# Patient Record
Sex: MALE | Race: WHITE | NOT HISPANIC OR LATINO | URBAN - METROPOLITAN AREA
[De-identification: names, ages, dates, MRNs, and addresses within clinical notes are randomized per-mention and may not be internally consistent; named-entity substitution may affect disease eponyms.]

---

## 2022-03-21 ENCOUNTER — LAB OUTSIDE AN ENCOUNTER (OUTPATIENT)
Dept: URBAN - METROPOLITAN AREA CLINIC 94 | Facility: CLINIC | Age: 57
End: 2022-03-21

## 2022-03-21 ENCOUNTER — WEB ENCOUNTER (OUTPATIENT)
Dept: URBAN - METROPOLITAN AREA CLINIC 94 | Facility: CLINIC | Age: 57
End: 2022-03-21

## 2022-03-21 ENCOUNTER — OFFICE VISIT (OUTPATIENT)
Dept: URBAN - METROPOLITAN AREA CLINIC 94 | Facility: CLINIC | Age: 57
End: 2022-03-21
Payer: COMMERCIAL

## 2022-03-21 VITALS
BODY MASS INDEX: 33.8 KG/M2 | WEIGHT: 255 LBS | TEMPERATURE: 97.7 F | HEART RATE: 99 BPM | HEIGHT: 73 IN | DIASTOLIC BLOOD PRESSURE: 82 MMHG | SYSTOLIC BLOOD PRESSURE: 133 MMHG

## 2022-03-21 DIAGNOSIS — I85.00 ESOPHAGEAL VARICES WITHOUT BLEEDING, UNSPECIFIED ESOPHAGEAL VARICES TYPE: ICD-10-CM

## 2022-03-21 DIAGNOSIS — K70.30 ALCOHOLIC CIRRHOSIS, UNSPECIFIED WHETHER ASCITES PRESENT: ICD-10-CM

## 2022-03-21 PROCEDURE — 99214 OFFICE O/P EST MOD 30 MIN: CPT | Performed by: INTERNAL MEDICINE

## 2022-03-21 RX ORDER — ATENOLOL 25 MG/1
TABLET ORAL
Qty: 0 | Refills: 0 | Status: DISCONTINUED | COMMUNITY
Start: 1900-01-01

## 2022-03-21 RX ORDER — SUCRALFATE 1 G/1
1 TABLET ON AN EMPTY STOMACH TABLET ORAL
Status: ACTIVE | COMMUNITY

## 2022-03-21 RX ORDER — CHLORHEXIDINE GLUCONATE 4 %
LIQUID (ML) TOPICAL
Qty: 0 | Refills: 0 | Status: DISCONTINUED | COMMUNITY
Start: 1900-01-01

## 2022-03-21 RX ORDER — FOLIC ACID 1 MG/1
TABLET ORAL
Qty: 0 | Refills: 0 | Status: DISCONTINUED | COMMUNITY
Start: 1900-01-01

## 2022-03-21 RX ORDER — FUROSEMIDE 40 MG/1
1 TABLET TABLET ORAL ONCE A DAY
Status: ACTIVE | COMMUNITY

## 2022-03-21 RX ORDER — SPIRONOLACTONE 50 MG/1
2 TABLETS TABLET, FILM COATED ORAL ONCE A DAY
Refills: 0 | Status: ACTIVE | COMMUNITY
Start: 1900-01-01

## 2022-03-21 RX ORDER — OMEPRAZOLE 40 MG/1
CAPSULE, DELAYED RELEASE PELLETS ORAL
Qty: 0 | Refills: 0 | Status: DISCONTINUED | COMMUNITY
Start: 1900-01-01

## 2022-03-21 RX ORDER — LACTULOSE 10 G/15ML
15 ML SOLUTION ORAL ONCE A DAY
Status: ACTIVE | COMMUNITY

## 2022-03-21 RX ORDER — AMLODIPINE BESYLATE 2.5 MG/1
1 TABLET TABLET ORAL ONCE A DAY
Refills: 0 | Status: ACTIVE | COMMUNITY
Start: 1900-01-01

## 2022-03-21 NOTE — HPI-TODAY'S VISIT:
Willis Davis Jr. is a  56 y.o. male with history of  Acute on chronic respiratory failure (HC), ADD (attention deficit disorder), Asthma, Chronic kidney disease, Cirrhosis (HC), Colon polyps, GIBBONS (dyspnea on exertion), End stage liver disease (HC), Heart failure (HC), Hypertension, Internal bleeding, Liver disease, and Portal hypertension (HC).  who was hospitalized 1/20/22-1/25/22 with generalized body swelling involving bilateral lower extremities and abdomen associated with shortness of breath  and fatigue.  ED course and work up shown generalized body swelling, CTA chest no evidence of PE, bilateral diffuse groundglass infiltrate with interval improvement.  1-21 diuresing well 1-22 continue with Lasix and spironoaldactone 1-23 continue with spironoaldactone and Lasix 1-24 continue with diuretics anasarca improving greatly, kidney function tolerating diuretics 1/25: pt near normal weight, significant fluid pulled off, discharge home  lABS, 1/25/22: WBC 2.2, HGB 10.8, Plts 53, INR 1.55.  Last EGD 5/2019: Grade I esophageal varices. Portal hypertensive gastropathy. Last colonoscopy 2019 : Colon polyps, was asked to return in 5 years.  Pt currently on: carafate, Lasix, aldactone, lactulose and amlodipine. Pt denies any GI symptoms at present time. Denies edema or ascites.

## 2022-03-22 LAB
A/G RATIO: 2.7
ALBUMIN: 4.6
ALKALINE PHOSPHATASE: 97
ALT (SGPT): 21
AST (SGOT): 34
BASO (ABSOLUTE): 0
BASOS: 1
BILIRUBIN, TOTAL: 1.3
BUN/CREATININE RATIO: 12
BUN: 8
CALCIUM: 9
CARBON DIOXIDE, TOTAL: 20
CHLORIDE: 108
CREATININE: 0.66
EGFR: 109
EOS (ABSOLUTE): 0.1
EOS: 1
GLOBULIN, TOTAL: 1.7
GLUCOSE: 93
HEMATOCRIT: 40.9
HEMATOLOGY COMMENTS:: (no result)
HEMOGLOBIN: 13.7
IMMATURE CELLS: (no result)
IMMATURE GRANS (ABS): 0
IMMATURE GRANULOCYTES: 0
INR: 1.3
LYMPHS (ABSOLUTE): 0.6
LYMPHS: 13
MCH: 32.5
MCHC: 33.5
MCV: 97
MONOCYTES(ABSOLUTE): 0.4
MONOCYTES: 10
NEUTROPHILS (ABSOLUTE): 3.2
NEUTROPHILS: 75
NRBC: (no result)
PLATELETS: 91
POTASSIUM: 4.3
PROTEIN, TOTAL: 6.3
PROTHROMBIN TIME: 13.5
RBC: 4.21
RDW: 12.7
SODIUM: 143
WBC: 4.3

## 2022-04-25 ENCOUNTER — CLAIMS CREATED FROM THE CLAIM WINDOW (OUTPATIENT)
Dept: URBAN - METROPOLITAN AREA CLINIC 94 | Facility: CLINIC | Age: 57
End: 2022-04-25
Payer: COMMERCIAL

## 2022-04-25 VITALS
TEMPERATURE: 98.8 F | SYSTOLIC BLOOD PRESSURE: 137 MMHG | HEART RATE: 93 BPM | WEIGHT: 259 LBS | BODY MASS INDEX: 34.33 KG/M2 | DIASTOLIC BLOOD PRESSURE: 98 MMHG | HEIGHT: 73 IN

## 2022-04-25 DIAGNOSIS — I85.00 ESOPHAGEAL VARICES WITHOUT BLEEDING, UNSPECIFIED ESOPHAGEAL VARICES TYPE: ICD-10-CM

## 2022-04-25 DIAGNOSIS — K70.30 ALCOHOLIC CIRRHOSIS OF LIVER WITHOUT ASCITES: ICD-10-CM

## 2022-04-25 DIAGNOSIS — M79.9 SOFT TISSUE LESION: ICD-10-CM

## 2022-04-25 DIAGNOSIS — K76.6 PORTAL HYPERTENSION: ICD-10-CM

## 2022-04-25 PROCEDURE — 99214 OFFICE O/P EST MOD 30 MIN: CPT | Performed by: PHYSICIAN ASSISTANT

## 2022-04-25 RX ORDER — SUCRALFATE 1 G/1
1 TABLET ON AN EMPTY STOMACH TABLET ORAL
Status: ACTIVE | COMMUNITY

## 2022-04-25 RX ORDER — FUROSEMIDE 40 MG/1
1 TABLET TABLET ORAL ONCE A DAY
Status: ACTIVE | COMMUNITY

## 2022-04-25 RX ORDER — LACTULOSE 10 G/15ML
15 ML SOLUTION ORAL ONCE A DAY
Status: ACTIVE | COMMUNITY

## 2022-04-25 RX ORDER — AMLODIPINE BESYLATE 2.5 MG/1
1 TABLET TABLET ORAL ONCE A DAY
Refills: 0 | Status: ACTIVE | COMMUNITY
Start: 1900-01-01

## 2022-04-25 RX ORDER — SUCRALFATE 1 G/1
1 TABLET ON AN EMPTY STOMACH TABLET ORAL
Qty: 90 | Refills: 3

## 2022-04-25 RX ORDER — SPIRONOLACTONE 50 MG/1
2 TABLETS TABLET, FILM COATED ORAL ONCE A DAY
Refills: 0 | Status: ACTIVE | COMMUNITY
Start: 1900-01-01

## 2022-04-25 RX ORDER — SPIRONOLACTONE 50 MG/1
2 TABLETS TABLET, FILM COATED ORAL ONCE A DAY
Qty: 60 TABLET | Refills: 6

## 2022-04-25 RX ORDER — FUROSEMIDE 40 MG/1
1 TABLET TABLET ORAL ONCE A DAY
Qty: 30 | Refills: 6

## 2022-04-25 RX ORDER — LACTULOSE 10 G/15ML
30 ML SOLUTION ORAL
Qty: 1800 ML | Refills: 6

## 2022-04-25 NOTE — HPI-TODAY'S VISIT:
56 yo M returns today for f/u visit to review recent CT and lab results. Pt with hx of alcoholic cirrhosis with portal HTN, esophageal varices and CKD and heart failure.  CT 4/2022 reveals cirrhotic morphology of the liver, portal HTN, no ascites, soft tissues attenuation with tortuous splenic vein cannot r/o non obstructive thrombus vs adenopathy   Labs are stable for patient with improvement in Hgb. ( See below).   Today patient denies any GI sx. He denies abdominal pain, N/V, changes in bowel habits, melena or hematochezia. Pt denies itching. Pt is scheduled for EGD at [facility] on 5/25/2022 with DR Payton. Pt denies ETOH use   Last EGD 5/2019: Grade I esophageal varices. Portal hypertensive gastropathy. Last colonoscopy 2019 : Colon polyps, was asked to return in 5 years.  Pt currently on: carafate, Lasix, aldactone, lactulose and amlodipine.

## 2022-04-25 NOTE — PHYSICAL EXAM CONSTITUTIONAL:
pale appearance, well developed, well nourished , in no acute distress , ambulating without difficulty , normal communication ability

## 2022-04-26 PROBLEM — 34742003: Status: ACTIVE | Noted: 2022-04-25

## 2022-05-16 ENCOUNTER — OFFICE VISIT (OUTPATIENT)
Dept: URBAN - METROPOLITAN AREA MEDICAL CENTER 34 | Facility: MEDICAL CENTER | Age: 57
End: 2022-05-16

## 2022-05-25 ENCOUNTER — OFFICE VISIT (OUTPATIENT)
Dept: URBAN - METROPOLITAN AREA MEDICAL CENTER 34 | Facility: MEDICAL CENTER | Age: 57
End: 2022-05-25
Payer: COMMERCIAL

## 2022-05-25 DIAGNOSIS — I85.10 ESOPH VARICE OTHER DIS: ICD-10-CM

## 2022-05-25 DIAGNOSIS — K31.89 ACQUIRED DEFORMITY OF DUODENUM: ICD-10-CM

## 2022-05-25 DIAGNOSIS — K76.6 CLINICALLY SIGNIFICANT PORTAL HYPERTENSION: ICD-10-CM

## 2022-05-25 PROCEDURE — 43235 EGD DIAGNOSTIC BRUSH WASH: CPT | Performed by: INTERNAL MEDICINE

## 2022-06-01 ENCOUNTER — OFFICE VISIT (OUTPATIENT)
Dept: URBAN - METROPOLITAN AREA CLINIC 94 | Facility: CLINIC | Age: 57
End: 2022-06-01
Payer: COMMERCIAL

## 2022-06-01 VITALS
WEIGHT: 262 LBS | HEART RATE: 78 BPM | DIASTOLIC BLOOD PRESSURE: 81 MMHG | BODY MASS INDEX: 34.72 KG/M2 | HEIGHT: 73 IN | SYSTOLIC BLOOD PRESSURE: 151 MMHG | TEMPERATURE: 97.3 F

## 2022-06-01 DIAGNOSIS — K70.30 ALCOHOLIC CIRRHOSIS OF LIVER WITHOUT ASCITES: ICD-10-CM

## 2022-06-01 PROBLEM — 420054005: Status: ACTIVE | Noted: 2022-04-25

## 2022-06-01 PROCEDURE — 99213 OFFICE O/P EST LOW 20 MIN: CPT | Performed by: INTERNAL MEDICINE

## 2022-06-01 RX ORDER — SUCRALFATE 1 G/1
1 TABLET ON AN EMPTY STOMACH TABLET ORAL
Qty: 90 | Refills: 3 | Status: ACTIVE | COMMUNITY

## 2022-06-01 RX ORDER — LACTULOSE 10 G/15ML
30 ML SOLUTION ORAL
Qty: 1800 ML | Refills: 6 | Status: ACTIVE | COMMUNITY

## 2022-06-01 RX ORDER — AMLODIPINE BESYLATE 2.5 MG/1
1 TABLET TABLET ORAL ONCE A DAY
Refills: 0 | Status: ACTIVE | COMMUNITY
Start: 1900-01-01

## 2022-06-01 RX ORDER — FUROSEMIDE 40 MG/1
1 TABLET TABLET ORAL ONCE A DAY
Qty: 30 | Refills: 6 | Status: ACTIVE | COMMUNITY

## 2022-06-01 RX ORDER — SPIRONOLACTONE 50 MG/1
2 TABLETS TABLET, FILM COATED ORAL ONCE A DAY
Qty: 60 TABLET | Refills: 6 | Status: ACTIVE | COMMUNITY

## 2022-06-01 NOTE — HPI-TODAY'S VISIT:
Pt with hx of alcoholic cirrhosis with portal HTN, esophageal varices and CKD and heart failure. CT 4/2022 reveals cirrhotic morphology of the liver, portal HTN, no ascites, soft tissues attenuation with tortuous splenic vein cannot r/o non obstructive thrombus vs adenopathy  Labs are stable for patient with improvement in Hgb. ( See below).  Today patient denies any GI sx. He denies abdominal pain, N/V, changes in bowel habits, melena or hematochezia. Pt denies itching. Pt denies ETOH use  Last EGD 5/2022 : Grade I esophageal varices. Portal hypertensive gastropathy. Last colonoscopy 2019 : Colon polyps, was asked to return in 5 years. Pt currently on: carafate, Lasix, aldactone, lactulose and amlodipine.

## 2022-12-07 ENCOUNTER — OFFICE VISIT (OUTPATIENT)
Dept: URBAN - METROPOLITAN AREA CLINIC 94 | Facility: CLINIC | Age: 57
End: 2022-12-07
Payer: COMMERCIAL

## 2022-12-07 VITALS
WEIGHT: 247 LBS | SYSTOLIC BLOOD PRESSURE: 145 MMHG | TEMPERATURE: 97.3 F | HEART RATE: 83 BPM | BODY MASS INDEX: 32.74 KG/M2 | DIASTOLIC BLOOD PRESSURE: 81 MMHG | HEIGHT: 73 IN

## 2022-12-07 DIAGNOSIS — I85.00 ESOPHAGEAL VARICES WITHOUT BLEEDING, UNSPECIFIED ESOPHAGEAL VARICES TYPE: ICD-10-CM

## 2022-12-07 DIAGNOSIS — R13.10 DYSPHAGIA, UNSPECIFIED TYPE: ICD-10-CM

## 2022-12-07 PROCEDURE — 99214 OFFICE O/P EST MOD 30 MIN: CPT | Performed by: INTERNAL MEDICINE

## 2022-12-07 RX ORDER — SPIRONOLACTONE 50 MG/1
2 TABLETS TABLET, FILM COATED ORAL ONCE A DAY
Qty: 60 TABLET | Refills: 6 | Status: ACTIVE | COMMUNITY

## 2022-12-07 RX ORDER — SUCRALFATE 1 G/1
1 TABLET ON AN EMPTY STOMACH TABLET ORAL
Qty: 90 | Refills: 3 | Status: ACTIVE | COMMUNITY

## 2022-12-07 RX ORDER — FUROSEMIDE 40 MG/1
1 TABLET TABLET ORAL ONCE A DAY
Qty: 30 | Refills: 6 | Status: ACTIVE | COMMUNITY

## 2022-12-07 RX ORDER — AMLODIPINE BESYLATE 2.5 MG/1
1 TABLET TABLET ORAL ONCE A DAY
Refills: 0 | Status: ACTIVE | COMMUNITY
Start: 1900-01-01

## 2022-12-07 RX ORDER — LACTULOSE 10 G/15ML
30 ML SOLUTION ORAL
Qty: 1800 ML | Refills: 6 | Status: ACTIVE | COMMUNITY

## 2022-12-07 NOTE — HPI-TODAY'S VISIT:
Pt states that he recently had his teeth removed . He presented to [facility] ER on 11/23/22 when food bolus got stuck in esophagus. He was eating a Burrito which got stuck. Pt subsequently threw up and food bolus spontaneously came out. Since then pt has noted mild dysphagia to solids. THinks it could be due to recent teeth removal. Denies N/V, GERD, hematemesis or melena. Labs 11/23/22: WBC 2.3, HGB 13, Plts 69. PT 16.6. INR 1.3.  Pt with hx of alcoholic cirrhosis with portal HTN, esophageal varices, CKD and heart failure. CT 4/2022 reveals cirrhotic morphology of the liver, portal HTN, no ascites, soft tissues attenuation with tortuous splenic vein cannot r/o non obstructive thrombus vs adenopathy   Last EGD 5/2022 : Grade I esophageal varices. Portal hypertensive gastropathy. Last colonoscopy 2019 : Colon polyps, was asked to return in 5 years.  Pt currently on: carafate, Lasix, aldactone, lactulose and amlodipine.

## 2022-12-20 PROBLEM — 14223005: Status: ACTIVE | Noted: 2022-03-21

## 2022-12-20 PROBLEM — 40739000: Status: ACTIVE | Noted: 2022-12-07

## 2023-01-17 ENCOUNTER — OFFICE VISIT (OUTPATIENT)
Dept: URBAN - METROPOLITAN AREA MEDICAL CENTER 34 | Facility: MEDICAL CENTER | Age: 58
End: 2023-01-17
Payer: COMMERCIAL

## 2023-01-17 DIAGNOSIS — I85.10 ESOPH VARICE OTHER DIS: ICD-10-CM

## 2023-01-17 DIAGNOSIS — K31.89 ACQUIRED DEFORMITY OF DUODENUM: ICD-10-CM

## 2023-01-17 DIAGNOSIS — K76.6 CLINICALLY SIGNIFICANT PORTAL HYPERTENSION: ICD-10-CM

## 2023-01-17 PROCEDURE — 43239 EGD BIOPSY SINGLE/MULTIPLE: CPT | Performed by: INTERNAL MEDICINE

## 2023-01-30 ENCOUNTER — LAB OUTSIDE AN ENCOUNTER (OUTPATIENT)
Dept: URBAN - METROPOLITAN AREA CLINIC 94 | Facility: CLINIC | Age: 58
End: 2023-01-30

## 2023-01-30 ENCOUNTER — OFFICE VISIT (OUTPATIENT)
Dept: URBAN - METROPOLITAN AREA CLINIC 94 | Facility: CLINIC | Age: 58
End: 2023-01-30
Payer: COMMERCIAL

## 2023-01-30 VITALS
HEIGHT: 73 IN | TEMPERATURE: 97.3 F | WEIGHT: 242 LBS | BODY MASS INDEX: 32.07 KG/M2 | SYSTOLIC BLOOD PRESSURE: 137 MMHG | HEART RATE: 85 BPM | DIASTOLIC BLOOD PRESSURE: 86 MMHG

## 2023-01-30 DIAGNOSIS — K70.30 ALCOHOLIC CIRRHOSIS, UNSPECIFIED WHETHER ASCITES PRESENT: ICD-10-CM

## 2023-01-30 PROBLEM — 420054005: Status: ACTIVE | Noted: 2022-03-21

## 2023-01-30 PROCEDURE — 99214 OFFICE O/P EST MOD 30 MIN: CPT | Performed by: INTERNAL MEDICINE

## 2023-01-30 RX ORDER — SPIRONOLACTONE 50 MG/1
2 TABLETS TABLET, FILM COATED ORAL ONCE A DAY
Qty: 60 TABLET | Refills: 6 | Status: ACTIVE | COMMUNITY

## 2023-01-30 RX ORDER — AMLODIPINE BESYLATE 2.5 MG/1
1 TABLET TABLET ORAL ONCE A DAY
Refills: 0 | Status: ACTIVE | COMMUNITY
Start: 1900-01-01

## 2023-01-30 RX ORDER — LACTULOSE 10 G/15ML
30 ML SOLUTION ORAL
Qty: 1800 ML | Refills: 6 | Status: ACTIVE | COMMUNITY

## 2023-01-30 RX ORDER — FUROSEMIDE 40 MG/1
1 TABLET TABLET ORAL ONCE A DAY
Qty: 30 | Refills: 6 | Status: ACTIVE | COMMUNITY

## 2023-01-30 RX ORDER — SUCRALFATE 1 G/1
1 TABLET ON AN EMPTY STOMACH TABLET ORAL
Qty: 90 | Refills: 3 | Status: ACTIVE | COMMUNITY

## 2023-01-30 NOTE — HPI-TODAY'S VISIT:
Pt with hx of alcoholic cirrhosis with portal HTN, esophageal varices, CKD and heart failure. CT 4/2022 reveals cirrhotic morphology of the liver, portal HTN, no ascites, soft tissues attenuation with tortuous splenic vein cannot r/o non obstructive thrombus vs adenopathy   EGD 5/2022 : Grade I esophageal varices. Portal hypertensive gastropathy. Last colonoscopy 2019 : Colon polyps, was asked to return in 5 years. Pt currently on: carafate, Lasix, aldactone, lactulose and amlodipine.  EGD 1/17/23:  - Small grade I esophageal varices, too small to be banded - Portal hypertensive gastropathy - Antral gastritis , biopsied. Path showed reactive gastropathy wihtout H Pylori. - Normal duodenum. Pt denies any GI symptoms at this time

## 2023-04-27 ENCOUNTER — TELEPHONE ENCOUNTER (OUTPATIENT)
Dept: URBAN - METROPOLITAN AREA CLINIC 94 | Facility: CLINIC | Age: 58
End: 2023-04-27

## 2023-04-27 RX ORDER — SUCRALFATE 1 G/1
1 TABLET ON AN EMPTY STOMACH TABLET ORAL
Qty: 90 | Refills: 3
End: 2023-08-25

## 2023-07-26 ENCOUNTER — OFFICE VISIT (OUTPATIENT)
Dept: URBAN - METROPOLITAN AREA CLINIC 94 | Facility: CLINIC | Age: 58
End: 2023-07-26

## 2023-08-23 ENCOUNTER — OFFICE VISIT (OUTPATIENT)
Dept: URBAN - METROPOLITAN AREA CLINIC 94 | Facility: CLINIC | Age: 58
End: 2023-08-23

## 2023-09-20 ENCOUNTER — LAB OUTSIDE AN ENCOUNTER (OUTPATIENT)
Dept: URBAN - METROPOLITAN AREA CLINIC 94 | Facility: CLINIC | Age: 58
End: 2023-09-20

## 2023-09-20 ENCOUNTER — DASHBOARD ENCOUNTERS (OUTPATIENT)
Age: 58
End: 2023-09-20

## 2023-09-20 ENCOUNTER — WEB ENCOUNTER (OUTPATIENT)
Dept: URBAN - METROPOLITAN AREA CLINIC 94 | Facility: CLINIC | Age: 58
End: 2023-09-20

## 2023-09-20 ENCOUNTER — OFFICE VISIT (OUTPATIENT)
Dept: URBAN - METROPOLITAN AREA CLINIC 94 | Facility: CLINIC | Age: 58
End: 2023-09-20
Payer: COMMERCIAL

## 2023-09-20 VITALS
HEART RATE: 82 BPM | SYSTOLIC BLOOD PRESSURE: 130 MMHG | WEIGHT: 265 LBS | BODY MASS INDEX: 35.12 KG/M2 | TEMPERATURE: 97.7 F | HEIGHT: 73 IN | DIASTOLIC BLOOD PRESSURE: 73 MMHG

## 2023-09-20 DIAGNOSIS — I85.10 SECONDARY ESOPHAGEAL VARICES WITHOUT BLEEDING: ICD-10-CM

## 2023-09-20 DIAGNOSIS — K70.30 ALCOHOLIC CIRRHOSIS, UNSPECIFIED WHETHER ASCITES PRESENT: ICD-10-CM

## 2023-09-20 DIAGNOSIS — K76.6 PORTAL HYPERTENSION: ICD-10-CM

## 2023-09-20 PROBLEM — 14223005: Status: ACTIVE | Noted: 2023-09-20

## 2023-09-20 PROCEDURE — 99214 OFFICE O/P EST MOD 30 MIN: CPT | Performed by: PHYSICIAN ASSISTANT

## 2023-09-20 RX ORDER — SPIRONOLACTONE 50 MG/1
2 TABLETS TABLET, FILM COATED ORAL ONCE A DAY
Qty: 60 TABLET | Refills: 6 | Status: ACTIVE | COMMUNITY

## 2023-09-20 RX ORDER — FUROSEMIDE 40 MG/1
1 TABLET TABLET ORAL ONCE A DAY
Qty: 30 | Refills: 6 | Status: ACTIVE | COMMUNITY

## 2023-09-20 RX ORDER — AMLODIPINE BESYLATE 2.5 MG/1
1 TABLET TABLET ORAL ONCE A DAY
Refills: 0 | Status: ACTIVE | COMMUNITY
Start: 1900-01-01

## 2023-09-20 RX ORDER — SPIRONOLACTONE 50 MG/1
1 TABLET TABLET, FILM COATED ORAL
Qty: 180 | Refills: 1

## 2023-09-20 RX ORDER — FUROSEMIDE 40 MG/1
1 TABLET TABLET ORAL ONCE A DAY
Qty: 90 TABLET | Refills: 1

## 2023-09-20 NOTE — PHYSICAL EXAM CHEST:
Spoke to patient, he is aware medication was sent in chest wall non-tender, breathing is unlabored without accessory muscle use, normal breath sounds

## 2023-09-20 NOTE — PHYSICAL EXAM GASTROINTESTINAL
Abdomen , soft, nontender, nondistended , no guarding or rigidity , no masses palpable , normal bowel sounds , Liver and Spleen,  no hepatosplenomegaly , liver nontender
Statement Selected

## 2023-09-21 LAB
A/G RATIO: 1.6
ABSOLUTE BASOPHILS: 20
ABSOLUTE EOSINOPHILS: 157
ABSOLUTE LYMPHOCYTES: 650
ABSOLUTE MONOCYTES: 216
ABSOLUTE NEUTROPHILS: 1758
ALBUMIN: 4.1
ALKALINE PHOSPHATASE: 59
ALT (SGPT): 12
AST (SGOT): 24
BASOPHILS: 0.7
BILIRUBIN, TOTAL: 1.1
BUN/CREATININE RATIO: 22
BUN: 14
CALCIUM: 8.6
CARBON DIOXIDE, TOTAL: 20
CHLORIDE: 108
CREATININE: 0.64
EGFR: 109
EOSINOPHILS: 5.6
GLOBULIN, TOTAL: 2.5
GLUCOSE: 107
HEMATOCRIT: 40.4
HEMOGLOBIN: 13.6
INR: 1.4
LYMPHOCYTES: 23.2
MCH: 32.8
MCHC: 33.7
MCV: 97.3
MONOCYTES: 7.7
MPV: 12.8
NEUTROPHILS: 62.8
PLATELET COUNT: 64
POTASSIUM: 4.1
PROTEIN, TOTAL: 6.6
PT: 14
RDW: 13
RED BLOOD CELL COUNT: 4.15
SODIUM: 140
WHITE BLOOD CELL COUNT: 2.8

## 2023-09-26 ENCOUNTER — TELEPHONE ENCOUNTER (OUTPATIENT)
Dept: URBAN - METROPOLITAN AREA CLINIC 94 | Facility: CLINIC | Age: 58
End: 2023-09-26

## 2024-01-10 ENCOUNTER — OFFICE VISIT (OUTPATIENT)
Dept: URBAN - METROPOLITAN AREA MEDICAL CENTER 34 | Facility: MEDICAL CENTER | Age: 59
End: 2024-01-10

## 2024-09-04 ENCOUNTER — OFFICE VISIT (OUTPATIENT)
Dept: URBAN - METROPOLITAN AREA CLINIC 94 | Facility: CLINIC | Age: 59
End: 2024-09-04
Payer: COMMERCIAL

## 2024-09-04 ENCOUNTER — LAB OUTSIDE AN ENCOUNTER (OUTPATIENT)
Dept: URBAN - METROPOLITAN AREA CLINIC 94 | Facility: CLINIC | Age: 59
End: 2024-09-04

## 2024-09-04 VITALS
WEIGHT: 275 LBS | HEIGHT: 73 IN | OXYGEN SATURATION: 96 % | SYSTOLIC BLOOD PRESSURE: 148 MMHG | HEART RATE: 68 BPM | DIASTOLIC BLOOD PRESSURE: 80 MMHG | TEMPERATURE: 98.9 F | BODY MASS INDEX: 36.45 KG/M2

## 2024-09-04 DIAGNOSIS — K70.30 ALCOHOLIC CIRRHOSIS, UNSPECIFIED WHETHER ASCITES PRESENT: ICD-10-CM

## 2024-09-04 DIAGNOSIS — K76.6 PORTAL HYPERTENSION: ICD-10-CM

## 2024-09-04 DIAGNOSIS — I85.10 SECONDARY ESOPHAGEAL VARICES WITHOUT BLEEDING: ICD-10-CM

## 2024-09-04 PROCEDURE — 99214 OFFICE O/P EST MOD 30 MIN: CPT | Performed by: PHYSICIAN ASSISTANT

## 2024-09-04 RX ORDER — AMLODIPINE BESYLATE 2.5 MG/1
1 TABLET TABLET ORAL ONCE A DAY
Refills: 0 | Status: ACTIVE | COMMUNITY
Start: 1900-01-01

## 2024-09-04 RX ORDER — FUROSEMIDE 40 MG/1
1 TABLET TABLET ORAL ONCE A DAY
Qty: 90 TABLET | Refills: 3

## 2024-09-04 RX ORDER — SPIRONOLACTONE 50 MG/1
1 TABLET TABLET, FILM COATED ORAL
Qty: 180 | Refills: 1 | Status: ACTIVE | COMMUNITY

## 2024-09-04 RX ORDER — SPIRONOLACTONE 50 MG/1
1 TABLET TABLET, FILM COATED ORAL
Qty: 180 | Refills: 3

## 2024-09-04 RX ORDER — FUROSEMIDE 40 MG/1
1 TABLET TABLET ORAL ONCE A DAY
Qty: 90 TABLET | Refills: 1 | Status: ACTIVE | COMMUNITY

## 2024-09-04 NOTE — PHYSICAL EXAM CONSTITUTIONAL:
well developed, well nourished , in no acute distress , ambulating without difficulty , normal communication ability, pale appearance

## 2024-09-04 NOTE — HPI-TODAY'S VISIT:
Pt with hx of alcoholic cirrhosis with portal HTN, esophageal varices  Pt was suppose to have EGD in January but canceled due to COVID pneumonia. He then had COVID again last mos. No recent labs or imaging available for review.   Denies abdominal pain, melena or hematochezia. Denies jaudice or swelling. Has 2 BMS daily. Pt continues Furosemide qd and Aldactone BID. Pt denies taking Lactulose. Denies ETOH use.  Pt currently on: carafate, Lasix, aldactone, lactulose and amlodipine.  CT 4/2022 reveals cirrhotic morphology of the liver, portal HTN, no ascites, soft tissues attenuation with tortuous splenic vein cannot r/o non obstructive thrombus vs adenopathy  Last colonoscopy 2019 : Colon polyps, was asked to return in 5 years.  EGD 1/17/23:  - Small grade I esophageal varices, too small to be banded - Portal hypertensive gastropathy - Antral gastritis , biopsied. Path showed reactive gastropathy wihtout H Pylori. - Normal duodenum.  EGD 5/2022 : Grade I esophageal varices. Portal hypertensive gastropathy.

## 2024-09-05 LAB
A/G RATIO: 1.4
ABSOLUTE BASOPHILS: 9
ABSOLUTE EOSINOPHILS: 90
ABSOLUTE LYMPHOCYTES: 550
ABSOLUTE MONOCYTES: 251
ABSOLUTE NEUTROPHILS: 1401
AFP, SERUM, TUMOR MARKER: 2.1
ALBUMIN: 3.9
ALKALINE PHOSPHATASE: 72
ALT (SGPT): 15
AST (SGOT): 28
BASOPHILS: 0.4
BILIRUBIN, TOTAL: 1.7
BUN/CREATININE RATIO: 24
BUN: 12
CALCIUM: 8.9
CARBON DIOXIDE, TOTAL: 23
CHLORIDE: 111
CREATININE: 0.49
EGFR: 117
EOSINOPHILS: 3.9
GLOBULIN, TOTAL: 2.8
GLUCOSE: 91
HEMATOCRIT: 36.9
HEMOGLOBIN: 11.9
INR: 1.4
LYMPHOCYTES: 23.9
MCH: 30.1
MCHC: 32.2
MCV: 93.2
MONOCYTES: 10.9
MPV: 11.3
NEUTROPHILS: 60.9
PLATELET COUNT: 70
POTASSIUM: 3.9
PROTEIN, TOTAL: 6.7
PT: 14.3
RDW: 15.2
RED BLOOD CELL COUNT: 3.96
SODIUM: 141
WHITE BLOOD CELL COUNT: 2.3

## 2024-09-06 ENCOUNTER — TELEPHONE ENCOUNTER (OUTPATIENT)
Dept: URBAN - METROPOLITAN AREA CLINIC 94 | Facility: CLINIC | Age: 59
End: 2024-09-06

## 2024-09-07 ENCOUNTER — LAB OUTSIDE AN ENCOUNTER (OUTPATIENT)
Dept: URBAN - METROPOLITAN AREA CLINIC 94 | Facility: CLINIC | Age: 59
End: 2024-09-07

## 2024-09-15 ENCOUNTER — WEB ENCOUNTER (OUTPATIENT)
Dept: URBAN - METROPOLITAN AREA CLINIC 94 | Facility: CLINIC | Age: 59
End: 2024-09-15

## 2024-11-12 ENCOUNTER — TELEPHONE ENCOUNTER (OUTPATIENT)
Dept: URBAN - METROPOLITAN AREA CLINIC 94 | Facility: CLINIC | Age: 59
End: 2024-11-12

## 2024-11-13 ENCOUNTER — OFFICE VISIT (OUTPATIENT)
Dept: URBAN - METROPOLITAN AREA MEDICAL CENTER 34 | Facility: MEDICAL CENTER | Age: 59
End: 2024-11-13
Payer: COMMERCIAL

## 2024-11-13 DIAGNOSIS — I85.10 ESOPH VARICE OTHER DIS: ICD-10-CM

## 2024-11-13 DIAGNOSIS — D64.89 ANEMIA DUE TO OTHER CAUSE: ICD-10-CM

## 2024-11-13 DIAGNOSIS — Z86.0100 HISTORY OF COLON POLYPS: ICD-10-CM

## 2024-11-13 DIAGNOSIS — K76.6 PORTAL HYPERTENSION: ICD-10-CM

## 2024-11-13 DIAGNOSIS — K31.89 OTHER DISEASES OF STOMACH AND DUODENUM: ICD-10-CM

## 2024-11-13 DIAGNOSIS — I86.8 VARICES OF OTHER SITES: ICD-10-CM

## 2024-11-13 PROCEDURE — 45378 DIAGNOSTIC COLONOSCOPY: CPT | Performed by: INTERNAL MEDICINE

## 2024-11-13 PROCEDURE — 43239 EGD BIOPSY SINGLE/MULTIPLE: CPT | Performed by: INTERNAL MEDICINE

## 2024-11-13 RX ORDER — OMEPRAZOLE 40 MG/1
1 CAPSULE 30 MINUTES BEFORE MORNING MEAL CAPSULE, DELAYED RELEASE ORAL ONCE A DAY
Qty: 90 | Refills: 3 | OUTPATIENT
Start: 2024-11-13

## 2024-11-13 RX ORDER — SPIRONOLACTONE 50 MG/1
1 TABLET TABLET, FILM COATED ORAL
Qty: 180 | Refills: 3 | Status: ACTIVE | COMMUNITY

## 2024-11-13 RX ORDER — AMLODIPINE BESYLATE 2.5 MG/1
1 TABLET TABLET ORAL ONCE A DAY
Refills: 0 | Status: ACTIVE | COMMUNITY
Start: 1900-01-01

## 2024-11-13 RX ORDER — FUROSEMIDE 40 MG/1
1 TABLET TABLET ORAL ONCE A DAY
Qty: 90 TABLET | Refills: 3 | Status: ACTIVE | COMMUNITY

## 2024-12-17 ENCOUNTER — OFFICE VISIT (OUTPATIENT)
Dept: URBAN - METROPOLITAN AREA CLINIC 94 | Facility: CLINIC | Age: 59
End: 2024-12-17